# Patient Record
Sex: MALE | Race: WHITE
[De-identification: names, ages, dates, MRNs, and addresses within clinical notes are randomized per-mention and may not be internally consistent; named-entity substitution may affect disease eponyms.]

---

## 2018-11-14 ENCOUNTER — HOSPITAL ENCOUNTER (OUTPATIENT)
Dept: HOSPITAL 62 - OROUT | Age: 48
Discharge: HOME | End: 2018-11-14
Attending: SURGERY
Payer: OTHER GOVERNMENT

## 2018-11-14 VITALS — DIASTOLIC BLOOD PRESSURE: 71 MMHG | SYSTOLIC BLOOD PRESSURE: 123 MMHG

## 2018-11-14 DIAGNOSIS — F17.210: ICD-10-CM

## 2018-11-14 DIAGNOSIS — Z88.1: ICD-10-CM

## 2018-11-14 DIAGNOSIS — K21.9: ICD-10-CM

## 2018-11-14 DIAGNOSIS — Z88.0: ICD-10-CM

## 2018-11-14 DIAGNOSIS — K60.3: Primary | ICD-10-CM

## 2018-11-14 LAB
ERYTHROCYTE [DISTWIDTH] IN BLOOD BY AUTOMATED COUNT: 12.8 % (ref 11.5–14)
HCT VFR BLD CALC: 46.6 % (ref 37.9–51)
HGB BLD-MCNC: 16.2 G/DL (ref 13.5–17)
MCH RBC QN AUTO: 33.6 PG (ref 27–33.4)
MCHC RBC AUTO-ENTMCNC: 34.8 G/DL (ref 32–36)
MCV RBC AUTO: 97 FL (ref 80–97)
PLATELET # BLD: 276 10^3/UL (ref 150–450)
RBC # BLD AUTO: 4.82 10^6/UL (ref 4.35–5.55)
WBC # BLD AUTO: 7.3 10^3/UL (ref 4–10.5)

## 2018-11-14 PROCEDURE — 36415 COLL VENOUS BLD VENIPUNCTURE: CPT

## 2018-11-14 PROCEDURE — 46270 REMOVE ANAL FIST SUBQ: CPT

## 2018-11-14 PROCEDURE — 85027 COMPLETE CBC AUTOMATED: CPT

## 2018-11-14 NOTE — DISCHARGE SUMMARY
Discharge Summary (SDC)





- Discharge


Final Diagnosis: 





fistula


Date of Surgery: 11/14/18


Discharge Date: 11/14/18


Condition: Stable


Treatment or Instructions: 








 [facility]


 [address] [place] [id number]


 Phone: [phone]    Fax:  [phone]


 








 Anal Surgery Discharge Instructions





1. General Information: 


a.   DO NOT DRIVE a car or operate dangerous machinery for 4.


b.   DO NOT consume alcohol, tranquilizers, sleeping medications or any non-

prescribed medications for 24 hours unless approved by your doctor or as long 

as taking narcotic prescription medications.


c.   DO NOT make important decisions or sign any important papers for the next 

24 hours.


d.   Have a responsible person with you tonight.


2. Activity Restrictions: __________ 2 weeks .


a.   Avoid heavy lifting or straining until you feel more comfortable.


b.   It is fine to go for walks, up and down steps, ride in a car. 


3. Treatment: 


a.   Tomorrow morning begin warm water sitz baths (soaks) with plain water. You 

may do 3-4 times per day or after bowel movements to help relieve spasm and 

pain. Place a dry gauze or panty liner over the sight to catch drainage and 

blood to help keep your clothing dry. 


b.   You may use Tucks or other medicated wipes to help clean the area as 

needed.


c.   External dressings and medicated gauze should be removed before sitz baths 

and bowel movements.


4. Medications: 


a.   You may take the prescription tablets for pain one tablet every 6 hours. (_

_Toradol__).


c.   Resume all normal medications unless a change is specified by your doctors.


d.   Stool softeners are encouraged to help you for 2-4 weeks to maintain a 

soft stool and avoid more painful bowel movements due to pain medication. 

Colace is often used.


e.   A numbing cream may be prescribed, this can be applied after sitz baths 

around the perianal area before the sight is covered with a gauze pad. 


f.   Constipation is very common after anal surgery and you may take over-the-

counter medications to help stimulate the bowel such as Milk of Magnesia, 

Senokot tablets, prune juice and drink plenty of water. 


5. Diet: 


       a. Begin with clear liquids and if you do well you may then advance to 

normal foods low in fat and protein at first. 


           Smaller portion size may be wise the first night. 


       b. Acidic (orange juice, tomato), foods high in ruffage (grapes, celery, 

asparagus) and spicy foods should be avoided for 


           comfort the first 2-3 weeks since they can cause 


           more burning sensation with bowel movements.


6..Follow Up Care:


a.   Please call the office to schedule a follow up appointment with your 

doctor for 2 weeks. In the event of any postoperative problems or questions or 

you may call the office during business hours or the On-Call physician evenings 

and weekends at [facility].   





[facility] [phone]     [facility] [phone]





   I understand the instructions for my postoperative care as described above 

and a copy has been given to me.





   _________________________          _________________________          _______

________


   Patient/Significant Other                    Witness                        

                       Date





Prescriptions: 


Ketorolac Tromethamine [Toradol 10 mg Tablet] 10 mg PO Q6HP PRN #20 tablet


 PRN Reason: 


Referrals: 


JORDON RAMOS MD [Primary Care Provider] - 


Discharge Diet: As Tolerated


Discharge Activity: No Lifting Over 10 Pounds, No Lifting/Push/Pulling, Walk 

Frequently


Report the Following to Your Physician Immediately: Increase in Pain, Fever 

over 101 Degrees, Unusual Bleeding, Redness, Drainage-Foul Smelling

## 2018-11-14 NOTE — OPERATIVE REPORT
Operative Report


DATE OF SURGERY: 11/14/18


PREOPERATIVE DIAGNOSIS: Perianal abscess with fistula in ano


POSTOPERATIVE DIAGNOSIS: Same


OPERATION: 1.  Examination under anesthesia.  2. Left posterior lateral 

fistulotomy.  3.  Use of intraoperative ultrasonography


SURGEON: JOHNNY BACK


1ST ASSISTANT: VERA WINN


ANESTHESIA: Spinal


TISSUE REMOVED OR ALTERED: Portion of abscess cavity


COMPLICATIONS: 





None


ESTIMATED BLOOD LOSS: Scant


INTRAOPERATIVE FINDINGS: see Below


PROCEDURE: 





The preop holding area taken to the main operating room where a great level of 

spinal anesthesia was induced by Dr. Brandt.  The patient was placed in the 

prone, recumbent knife position, buttocks spread, taped widely, perianal tissue 

shaved.





The perineum and perianal tissue was prepped and draped in sterile fashion.  

Surgical plan surgical timeout were conducted.





Examination of the perianal tissue revealed circumferential, healed pox marks 

consistent with covered perianal infection.  There was no evidence of active 

drainage, foul smell or bleeding.





The anal canal was patulous.  It readily admitted to adult fingers.  In the 

posterior lateral position on the left side there was a suggestion of a 

depression at the dentate line.  The perianal tissue was Billie times with 

quarter percent Marcaine, plain





The bullet anoscope was inserted into the anal canal.  Circumferential 

evaluation of the anal rectal canal revealed no evidence of purulent discharge, 

granulation tissue.  We did perform intraoperative ultrasonography with a 

variable frequency linear transducer.  There was a very subtle area of 

hypoechogenicity in the deep subcutaneous tissue in the left posterior lateral 

position.





Despite  the very unimpressive findings on physical examination today, given 

the history of active chronic drainage from the left posterior anal region, and 

the slight depression at the dentate line on examination currently, I felt that 

a occult, residual fistula tract was present.  Using a probe, we opened up the 

external drainage site, and insinuated the probe through the tract gently, 

opening up into the anal canal at the dentate line in a direct radial fashion.  

Careful palpation of the tract suggested that the probe was going through a few 

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  As 

this is a male, with a chronic small tract, only partially involving the 

external sphincter muscle, I felt that a direct fistulotomy would be safe.  

Therefore the tract was filleted open with the electrocautery.  A small portion 

of the renal skin was excised and disposed of.  Some hemorrhoidal tissue was 

also lysed from the same open wound.  We cauterized some of the subcu mucosal 

tissue, but no further bleeding ensued.  We elected to leave the tract open.





At this point we felt the operation was complete.  Sponge and needle counts are 

correct.  4 x 4 applied, patient was rotated into the supine position, taken to 

the recovery room in good condition.





The physician assistant, Ms. Morris, provided assistance during this case by: 

Assisting and port insertion, retracting tissue, instillation of local 

anesthesia and closure of skin incisions.





The physician assistant, Ms. Morris, provided assistance during this case by: 

Assisting  retracting tissue, instillation of local anesthesia and closure of 

skin incisions.

## 2020-09-11 ENCOUNTER — HOSPITAL ENCOUNTER (EMERGENCY)
Dept: HOSPITAL 62 - ER | Age: 50
Discharge: HOME | End: 2020-09-11
Payer: OTHER GOVERNMENT

## 2020-09-11 VITALS — SYSTOLIC BLOOD PRESSURE: 150 MMHG | DIASTOLIC BLOOD PRESSURE: 88 MMHG

## 2020-09-11 DIAGNOSIS — Z88.0: ICD-10-CM

## 2020-09-11 DIAGNOSIS — K02.9: Primary | ICD-10-CM

## 2020-09-11 DIAGNOSIS — R68.84: ICD-10-CM

## 2020-09-11 DIAGNOSIS — F17.210: ICD-10-CM

## 2020-09-11 DIAGNOSIS — Z88.1: ICD-10-CM

## 2020-09-11 DIAGNOSIS — K05.10: ICD-10-CM

## 2020-09-11 DIAGNOSIS — Z71.6: ICD-10-CM

## 2020-09-11 PROCEDURE — 99283 EMERGENCY DEPT VISIT LOW MDM: CPT

## 2020-09-11 PROCEDURE — 99406 BEHAV CHNG SMOKING 3-10 MIN: CPT

## 2020-09-11 NOTE — ER DOCUMENT REPORT
ED Oral Problem





- General


Chief Complaint: Jaw Pain


Stated Complaint: JAW PAIN


Time Seen by Provider: 09/11/20 19:43


Primary Care Provider: 


JORDON RAMOS MD [NO LOCAL MD] - Follow up as needed


Mode of Arrival: Ambulatory


Information source: Patient


Notes: 





40-year-old male presented to ED for complaint of right jaw pain for the last 

week and half.  He states he sometimes grits his teeth and he did note that was 

what was going on.  He does have multiple decayed teeth on the right side of his

jaw.  He does not have an ear infection there is no swelling to the right side 

of his face.  He states usually when he gets some antibiotics when he has this 

pain the pain goes away.  States he does have multiple decayed teeth moderate 

gingivitis will put him on clindamycin for 7 days and have him follow-up with a 

dentist.


TRAVEL OUTSIDE OF THE [place] IN LAST 30 DAYS: No





- HPI


Patient complains to provider of: Jaw pain, Toothache


Onset: Gradual


Quality of pain: Achy


Severity: Moderate


Pain Level: 2


Associated symptoms: Jaw pain, Toothache


Worsened by: Nothing


Relieved by: Nothing


Similar symptoms previously: Yes


Recently seen / treated by doctor/dentist: No





- Related Data


Allergies/Adverse Reactions: 


                                        





erythromycin base Allergy (Verified 11/13/18 14:13)


   


Penicillins Allergy (Verified 11/13/18 14:13)


   











Past Medical History





- General


Information source: Patient





- Social History


Smoking Status: Current Every Day Smoker


Cigarette use (# per day): Yes - Pack per day


Chew tobacco use (# tins/day): No


Smoking Education Provided: Yes - 4 minutes


Frequency of alcohol use: None


Drug Abuse: Marijuana


Family History: Reviewed & Not Pertinent


Patient has homicidal ideation: No





- Past Medical History


Cardiac Medical History: Reports: None


Pulmonary Medical History: Reports: None


EENT Medical History: Reports: None


Neurological Medical History: Reports: None


Endocrine Medical History: Reports: None


Renal/ Medical History: Reports: None


Malignancy Medical History: Reports None


GI Medical History: Reports: Hx Gastritis, Hx Gastroesophageal Reflux Disease, 

Hx Irritable Bowel


Musculoskeletal Medical History: Reports Hx Arthritis - BACK, KNEES


Skin Medical History: Reports None


Psychiatric Medical History: Reports: Hx Anxiety, Other - Insomnia


Traumatic Medical History: Reports: Other - Paralyzed left sciatic nerve from 

the 


Infectious Medical History: Reports: None


Surgical Hx: Negative


Past Surgical History: Reports: None





- Immunizations


Hx Diphtheria, Pertussis, Tetanus Vaccination: Yes





Review of Systems





- Review of Systems


Constitutional: No symptoms reported


EENT: Mouth pain, Dental problem, Other - Left jaw pain


Cardiovascular: No symptoms reported


Respiratory: No symptoms reported


Gastrointestinal: No symptoms reported


Genitourinary: No symptoms reported


Male Genitourinary: No symptoms reported


Musculoskeletal: No symptoms reported


Skin: No symptoms reported


Hematologic/Lymphatic: No symptoms reported


Neurological/Psychological: No symptoms reported





Physical Exam





- Vital signs


Vitals: 


                                        











Temp Pulse Resp BP Pulse Ox


 


 97.8 F   103 H  18   142/92 H  100 


 


 09/11/20 19:34  09/11/20 19:34  09/11/20 19:34  09/11/20 19:34  09/11/20 19:34











Interpretation: Normal





- General


General appearance: Appears well, Alert





- HEENT


Head: Normocephalic, Atraumatic


Eyes: Normal


Pupils: PERRL


Ears: Normal


External canal: Normal


Tympanic membrane: Normal


Sinus: Normal


Nasal: Normal


Mouth/Lips: Normal


Mucous membranes: Normal


Teeth diagram: 


                            __________________________














                            __________________________





 1 - Couple decayed teeth multiple missing teeth





Pharynx: Normal


Neck: Normal





- Respiratory


Respiratory status: No respiratory distress


Chest status: Nontender


Breath sounds: Normal


Chest palpation: Normal





- Cardiovascular


Rhythm: Regular


Heart sounds: Normal auscultation


Murmur: No





- Abdominal


Inspection: Normal


Distension: No distension


Bowel sounds: Normal


Tenderness: Nontender


Organomegaly: No organomegaly





- Back


Back: Normal, Nontender





- Extremities


General upper extremity: Normal inspection, Nontender, Normal color, Normal ROM,

Normal temperature


General lower extremity: Normal inspection, Nontender, Normal color, Normal ROM,

Normal temperature, Normal weight bearing.  No: Pastora's sign





- Neurological


Neuro grossly intact: Yes


Cognition: Normal


Orientation: AAOx4


[place] Coma Scale Eye Opening: Spontaneous


[place] Coma Scale Verbal: Oriented


[place] Coma Scale Motor: Obeys Commands


Mignon Coma Scale Total: 15


Speech: Normal


Motor strength normal: LUE, RUE, LLE, RLE


Sensory: Normal





- Psychological


Associated symptoms: Normal affect, Normal mood





- Skin


Skin Temperature: Warm


Skin Moisture: Dry


Skin Color: Normal





Course





- Re-evaluation


Re-evalutation: 





09/11/20 20:00


Presentation is most consistent with likely an infected tooth.  Airway is 

patent.  Vitals within normal limits.  Patient is able swallow without any 

difficulty.  There is no significant facial swelling.  No evidence of Juan 

angina, apical abscess, or airway obstruction.  Patient will be started on 

antibiotics.  I've instructed to follow-up with dentistry as earliest ability 

for definitive management.  At this time will discharge with return precautions 

and follow-up recommendations.  Verbal discharge instructions given a the 

bedside and opportunity for questions given. Medication warnings reviewed. 

Patient is in agreement with this plan and has verbalized understanding of 

return precautions and the need for primary care follow-up in the next 24-72 

hours.





- Vital Signs


Vital signs: 


                                        











Temp Pulse Resp BP Pulse Ox


 


 97.8 F   103 H  18   150/88 H  100 


 


 09/11/20 19:34  09/11/20 19:34  09/11/20 19:34  09/11/20 20:01  09/11/20 19:34














Discharge





- Discharge


Clinical Impression: 


 Pain due to dental caries





Condition: Stable


Disposition: HOME, SELF-CARE


Additional Instructions: 


TOOTHACHE:





     Your pain is due to dental decay.  The tooth must be repaired in order for 

you to feel better.  You will, therefore, be referred to a dentist.  We do not 

have dentists on the staff at [facility].


     Severe swelling or drainage around a tooth usually means a dental abscess. 

This also requires evaluation and treatment by the dentist, but antibiotics may 

be prescribed while awaiting dental treatment.


     You should be rechecked immediately if you develop major swelling of the fa

ce, increasing pain, a lump in the jaw or gums, headache, difficulty swallowing,

or fever.





CLINDAMYCIN:


     You have been given a prescription for the antibiotic clindamycin.  It is 

often prescribed for infections in the mouth, such as dental infections or 

abscesses, and for skin infections due to MRSA.  It's important that you take 

all the medication, unless instructed otherwise by your physician.  Failure to 

complete the entire course can result in relapse of your condition.


     Common side effects of antibiotics include nausea, intestinal cramping, or 

diarrhea.  Women may develop vaginal yeast infections, and babies can get yeast 

(thrush) in the mouth following the use of antibiotics.  Contact your physician 

if you develop significant side effects from this medication.


     Allergy to this antibiotic can result in hives, wheezing, faintness, or 

itching.  If symptoms of allergy occur, stop the medication and call the doctor.





Salt and soda solution


1 quart of water


1 tablespoon of salt


1 teaspoon of baking soda


Mixed 3 ingredients together and boil for 1 minute


Placed in a covered quart jar


Use 1/2 ounce of cold solution to gargle 3 times a day





FOLLOW-UP CARE:


     You have been referred for follow-up care to the dentists listed below.  

Call the dentists office for an appointment as you were instructed or within 

the next two days.  If you experience worsening or a significant change in your 

symptoms, notify the physician immediately or return to the Emergency Department

at any time for re-evaluation.





[facility]


[address] [place] [address] [phone]





[facility]


[address]


[phone]





[facility]


[address] [facility] [place]


[phone]





[facility]


[address]


[phone]


[email or web address]





[facility]


[address] [place]  [address] [phone]


Monday-Thursdays  8:00am to 5:00 pm


Will see patients from other [facility].


Charges based on income and family size and


accepts Medicare, Medicaid, and Insurances


Will pull molars





[facility]


Student Clinics


[facility] [address] [phone]


Hours of Operation


   8:00 am - 4:30 pm weekdays





The following dental offices accept Medicaid:





   [facility]   [phone]


   Dr. Daley         [phone]


   Dr. Hsu         [phone]


   Dr. Brumfield         [phone]


   Dr. Cosby         [phone]


   [facility]


      oral surgery      [phone]


   Dr. Flor ([place])      [phone]


   Dr. Argueta ([employer])   [phone]


   [facility]      [phone]


   [facility] ([place])   [phone]


   Dr. Godoy ([place])      [phone]


   [facility]      [phone]


   [facility]   [phone]


   [facility]   [phone]


   Dr. Warren ([place])      [phone]


   [facility] and 


      ([place])      [phone]





   Medicaid Care Line      [phone]


Prescriptions: 


Clindamycin HCl 300 mg PO Q6 #28 capsule


Forms:  Elevated Blood Pressure, Smoking Cessation Education


Referrals: 


JORDON RAMOS MD [NO LOCAL MD] - Follow up as needed